# Patient Record
Sex: FEMALE
[De-identification: names, ages, dates, MRNs, and addresses within clinical notes are randomized per-mention and may not be internally consistent; named-entity substitution may affect disease eponyms.]

---

## 2017-04-12 ENCOUNTER — HOSPITAL ENCOUNTER (EMERGENCY)
Dept: HOSPITAL 31 - C.ER | Age: 72
Discharge: HOME | End: 2017-04-12
Payer: MEDICARE

## 2017-04-12 VITALS
OXYGEN SATURATION: 100 % | TEMPERATURE: 97.7 F | SYSTOLIC BLOOD PRESSURE: 113 MMHG | HEART RATE: 73 BPM | DIASTOLIC BLOOD PRESSURE: 73 MMHG

## 2017-04-12 VITALS — RESPIRATION RATE: 16 BRPM

## 2017-04-12 DIAGNOSIS — M79.662: ICD-10-CM

## 2017-04-12 DIAGNOSIS — R10.32: Primary | ICD-10-CM

## 2017-04-12 LAB
ALBUMIN/GLOB SERPL: 1.5 {RATIO} (ref 1–2.1)
ALP SERPL-CCNC: 81 U/L (ref 38–126)
ALT SERPL-CCNC: 17 U/L (ref 9–52)
AST SERPL-CCNC: 16 U/L (ref 14–36)
BACTERIA #/AREA URNS HPF: (no result) /[HPF]
BASOPHILS # BLD AUTO: 0.1 K/UL (ref 0–0.2)
BASOPHILS NFR BLD: 0.8 % (ref 0–2)
BILIRUB SERPL-MCNC: 0.4 MG/DL (ref 0.2–1.3)
BILIRUB UR-MCNC: NEGATIVE MG/DL
BUN SERPL-MCNC: 14 MG/DL (ref 7–17)
CALCIUM SERPL-MCNC: 9.5 MG/DL (ref 8.6–10.4)
CHLORIDE SERPL-SCNC: 88 MMOL/L (ref 98–107)
CO2 SERPL-SCNC: 36 MMOL/L (ref 22–30)
EOSINOPHIL # BLD AUTO: 0.1 K/UL (ref 0–0.7)
EOSINOPHIL NFR BLD: 0.8 % (ref 0–4)
ERYTHROCYTE [DISTWIDTH] IN BLOOD BY AUTOMATED COUNT: 14.1 % (ref 11.5–14.5)
GLOBULIN SER-MCNC: 3.2 GM/DL (ref 2.2–3.9)
GLUCOSE SERPL-MCNC: 139 MG/DL (ref 65–105)
GLUCOSE UR STRIP-MCNC: NORMAL MG/DL
HCT VFR BLD CALC: 45.8 % (ref 34–47)
HYALINE CASTS #/AREA URNS LPF: (no result) /LPF (ref 0–2)
KETONES UR STRIP-MCNC: NEGATIVE MG/DL
LEUKOCYTE ESTERASE UR-ACNC: (no result) LEU/UL
LYMPHOCYTES # BLD AUTO: 1.8 K/UL (ref 1–4.3)
LYMPHOCYTES NFR BLD AUTO: 17.6 % (ref 20–40)
MCH RBC QN AUTO: 27.6 PG (ref 27–31)
MCHC RBC AUTO-ENTMCNC: 32.5 G/DL (ref 33–37)
MCV RBC AUTO: 84.8 FL (ref 81–99)
MONOCYTES # BLD: 0.8 K/UL (ref 0–0.8)
MONOCYTES NFR BLD: 7.5 % (ref 0–10)
NRBC BLD AUTO-RTO: 0.2 % (ref 0–2)
PH UR STRIP: 6 [PH] (ref 5–8)
PLATELET # BLD: 329 K/UL (ref 130–400)
PMV BLD AUTO: 9.1 FL (ref 7.2–11.7)
POTASSIUM SERPL-SCNC: 4.6 MMOL/L (ref 3.6–5.2)
PROT SERPL-MCNC: 7.9 G/DL (ref 6.3–8.3)
PROT UR STRIP-MCNC: NEGATIVE MG/DL
RBC # UR STRIP: NEGATIVE /UL
RBC #/AREA URNS HPF: < 1 /HPF (ref 0–3)
SODIUM SERPL-SCNC: 140 MMOL/L (ref 132–148)
SP GR UR STRIP: 1.01 (ref 1–1.03)
UROBILINOGEN UR-MCNC: NORMAL MG/DL (ref 0.2–1)
WBC # BLD AUTO: 10.3 K/UL (ref 4.8–10.8)
WBC #/AREA URNS HPF: < 1 /HPF (ref 0–5)

## 2017-04-12 PROCEDURE — 74176 CT ABD & PELVIS W/O CONTRAST: CPT

## 2017-04-12 PROCEDURE — 99285 EMERGENCY DEPT VISIT HI MDM: CPT

## 2017-04-12 PROCEDURE — 85025 COMPLETE CBC W/AUTO DIFF WBC: CPT

## 2017-04-12 PROCEDURE — 96375 TX/PRO/DX INJ NEW DRUG ADDON: CPT

## 2017-04-12 PROCEDURE — 81001 URINALYSIS AUTO W/SCOPE: CPT

## 2017-04-12 PROCEDURE — 96374 THER/PROPH/DIAG INJ IV PUSH: CPT

## 2017-04-12 PROCEDURE — 80053 COMPREHEN METABOLIC PANEL: CPT

## 2017-04-12 PROCEDURE — 83690 ASSAY OF LIPASE: CPT

## 2017-04-12 PROCEDURE — 93005 ELECTROCARDIOGRAM TRACING: CPT

## 2017-04-12 NOTE — CARD
--------------- APPROVED REPORT --------------





EKG Measurement

Heart [id number]

CT 114P45

[id number]

[id number]

[id number]



<Conclusion>

*** Poor data quality, interpretation may be adversely affected

Normal sinus rhythm

Minimal voltage criteria for LVH, may be normal variant

Borderline ECG

## 2017-04-12 NOTE — CT
PROCEDURE:  CT Abdomen and Pelvis without intravenous contrast



HISTORY:

Pain



COMPARISON:

None.



TECHNIQUE:

Helical CT scan of the abdomen and pelvis was performed without 

administration of oral or intravenous contrast.  Coronal and sagittal 

reformatted images were obtained.



Radiation dose:



Total exam DLP = 813.04 mGy-cm.



This CT exam was performed using one or more of the following dose 

reduction techniques: Automated exposure control, adjustment of the 

mA and/or kV according to patient size, and/or use of iterative 

reconstruction technique.



FINDINGS:



LOWER THORAX:

The right lung base is clear.  There is a 7 mm subpleural nodule in 

the left lower lobe (series 2 image 10).  The left lung base is 

clear. 



LIVER:

The liver is normal in size. No gross lesion or ductal dilatation.  



GALLBLADDER AND BILE DUCTS:

Surgically absent. 



PANCREAS:

The pancreas is normal in size. No ductal dilatation or 

calcifications.



SPLEEN:

The spleen is normal in size. 



ADRENALS:

Both adrenal glands are normal in size without discrete nodule 



KIDNEYS AND URETERS:

Both kidneys are normal in size.  There is no hydronephrosis or 

nephrolithiasis. 



VASCULATURE:

Normal in appearance. No aortic aneurysm. 



BOWEL:

The small bowel loops are normal in caliber.  There is left colonic 

diverticulosis and apparent mild mural thickening of the descending 

and sigmoid colon. No evidence of fluid collection or 

microperforation. 



APPENDIX:

Normal appendix. 



PERITONEUM:

No free fluid. No free air. 



LYMPH NODES:

No enlarged lymph nodes. 



BLADDER:

Partially decompressed. 



REPRODUCTIVE:

Unremarkable. 



BONES:

No acute fracture. Mild multilevel degenerative disc disease. No 

destructive bony lesions. 



OTHER FINDINGS:

None.



IMPRESSION:

Left colonic diverticulosis.  Apparent mild mural thickening of the 

descending and sigmoid colon is nonspecific and could be related to 

underdistention however early acute diverticulitis cannot be 

excluded.  Clinical follow-up is advised. No evidence of 

microperforation or abscess.



No evidence of nephrolithiasis, obstructive uropathy or acute 

appendicitis.

## 2017-04-12 NOTE — C.PDOC
History Of Present Illness


Patient is a 71 year old female who presents to the ER with a complaint of 

cramping pain from her left foot radiating to her left lower leg. Patients 

states while she was walking to the bathroom  it radiated to her left abdomen 

and left back. Denies falling, urinary symptoms, vomiting, diarrhea


 or fever. 





Time Seen by Provider: 04/12/17 06:07


Chief Complaint (Nursing): Lower Extremity Problem/Injury


History Per: Patient


History/Exam Limitations: no limitations


Onset/Duration Of Symptoms: Hrs


Current Symptoms Are (Timing): Still Present





Past Medical History


Reviewed: Historical Data, Nursing Documentation, Vital Signs


Vital Signs: 


 Last Vital Signs











Temp  97.6 F   04/12/17 06:06


 


Pulse  75   04/12/17 06:06


 


Resp  16   04/12/17 06:06


 


BP  116/76   04/12/17 06:06


 


Pulse Ox  99   04/12/17 06:36














- Medical History


PMH: Anxiety, Arthritis, Asthma, HTN, Osteoporosis


Surgical History: Cholecystectomy


Family History: States: Unknown Family Hx





- Social History


Hx Alcohol Use: No


Hx Substance Use: No





- Immunization History


Hx Influenza Vaccination: No


Hx Pneumococcal Vaccination: No





Review Of Systems


Except As Marked, All Systems Reviewed And Found Negative.


Constitutional: Negative for: Fever, Chills


Gastrointestinal: Negative for: Vomiting


Genitourinary: Negative for: Dysuria, Hematuria


Musculoskeletal: Positive for: Back Pain, Leg Pain, Foot Pain, Other (Abdominal 

pain)





Physical Exam





- Physical Exam


Appears: Non-toxic


Skin: Normal Color, Warm, Dry


Head: Atraumatic, Normacephalic


Oral Mucosa: Moist


Chest: Symmetrical


Cardiovascular: Rhythm Regular


Respiratory: Normal Breath Sounds, No Rales, No Rhonchi, No Wheezing


Gastrointestinal/Abdominal: Soft, Tenderness (Left lower quadrant )


Back: CVA Tenderness (Left)


Extremity: Normal ROM, No Calf Tenderness


Pulses: Left Femoral: Normal, Right Femoral: Normal, Left Dorsalis Pedis: Normal

, Right Dorsalis Pedis: Normal


Neurological/Psych: Oriented x3, Normal Speech, Normal Cognition





ED Course And Treatment





- Laboratory Results


Result Diagrams: 


 04/12/17 06:54





O2 Sat by Pulse Oximetry: 99 (Room air)


Pulse Ox Interpretation: Normal


Progress Note: Blood work and urinalysis ordered. Toradol IVP administered.





Disposition





- Disposition


Disposition Time: 07:06


Condition: STABLE





- Clinical Impression


Clinical Impression: 


 Abdominal pain, Pain of lower extremity





- Scribe Statement


The provider has reviewed the documentation as recorded by the Scribe


Silver Schaeffer





All medical record entries made by the Scribe were at my direction and 

personally dictated by me. I have reviewed the chart and agree that the record 

accurately reflects my personal performance of the history, physical exam, 

medical decision making, and the department course for this patient. I have 

also personally directed, reviewed, and agree with the discharge instructions 

and disposition.

## 2017-10-24 ENCOUNTER — HOSPITAL ENCOUNTER (EMERGENCY)
Dept: HOSPITAL 31 - C.ER | Age: 72
Discharge: HOME | End: 2017-10-24
Payer: COMMERCIAL

## 2017-10-24 VITALS — HEART RATE: 71 BPM | SYSTOLIC BLOOD PRESSURE: 121 MMHG | DIASTOLIC BLOOD PRESSURE: 77 MMHG | RESPIRATION RATE: 16 BRPM

## 2017-10-24 VITALS — TEMPERATURE: 98.6 F

## 2017-10-24 VITALS — OXYGEN SATURATION: 99 %

## 2017-10-24 DIAGNOSIS — B37.2: Primary | ICD-10-CM

## 2017-10-24 DIAGNOSIS — L23.9: ICD-10-CM

## 2017-10-24 PROCEDURE — 96361 HYDRATE IV INFUSION ADD-ON: CPT

## 2017-10-24 PROCEDURE — 96374 THER/PROPH/DIAG INJ IV PUSH: CPT

## 2017-10-24 PROCEDURE — 99284 EMERGENCY DEPT VISIT MOD MDM: CPT

## 2017-10-24 PROCEDURE — 96375 TX/PRO/DX INJ NEW DRUG ADDON: CPT

## 2017-10-24 NOTE — C.PDOC
History Of Present Illness


Patient is a 71 year old female presents to Emergency Department for evaluation 

of allergic reaction that developed yesterday. Patient states that she 

developed itchy rash to the face, lower abdomen, and inguinal region. Notes 

being seen by PMD who gave her prescriptions of unknown medicine, which she 

states she was not able to fill. Notes taking Benadryl, and using calamine 

lotion without relief. Denies trouble swallowing, throat swelling, shortness of 

breath, chest pain, dizziness, or fever.





Time Seen by Provider: 10/24/17 12:11


Chief Complaint (Nursing): Allergic Reaction


History Per: Patient


History/Exam Limitations: no limitations


Onset/Duration Of Symptoms: Days (1)


Current Symptoms Are (Timing): Still Present


Associated Symptoms: Skin Rash, Itching.  denies: Swelling, Dyspnea, Trouble 

Swallowing, Dizziness, Redness, Chest Pain


Home/EMS Treatment: Benadryl


Recent travel outside of the [place]: No


Additional History Per: Patient





Past Medical History


Reviewed: Historical Data, Nursing Documentation, Vital Signs


Vital Signs: 


 Last Vital Signs











Temp  98.6 F   10/24/17 13:28


 


Pulse  71   10/24/17 13:28


 


Resp  16   10/24/17 13:28


 


BP  121/77   10/24/17 13:28


 


Pulse Ox  98   10/24/17 13:28














- Medical History


PMH: Anxiety, Arthritis, Asthma, HTN, Osteoporosis


Surgical History: Cholecystectomy


Family History: States: Unknown Family Hx





- Social History


Hx Alcohol Use: Yes


Hx Substance Use: No





- Immunization History


Hx Influenza Vaccination: No


Hx Pneumococcal Vaccination: No





Review Of Systems


Except As Marked, All Systems Reviewed And Found Negative.


Constitutional: Negative for: Fever, Chills


ENT: Negative for: Mouth Pain, Throat Pain, Throat Swelling


Cardiovascular: Negative for: Chest Pain


Respiratory: Negative for: Shortness of Breath


Gastrointestinal: Negative for: Nausea, Vomiting, Abdominal Pain, Diarrhea


Skin: Positive for: Rash


Neurological: Negative for: Headache, Dizziness





Physical Exam





- Physical Exam


Appears: Non-toxic, No Acute Distress


Skin: Warm, Dry, Rash (macular erythematous rash to inguinal folds and 

suprapubic region with shiny exterior and patchy margins; bright erythema 

surrounding nasolabial folds, no weeping, no vesicles)


Head: Atraumatic, Normacephalic


Eye(s): bilateral: Normal Inspection


Oral Mucosa: Moist


Tongue: Normal Appearing, No Swelling


Lips: Normal Appearing, No Swelling


Throat: Normal, No Drooling


Neck: Normal ROM, Supple


Chest: Symmetrical


Cardiovascular: Rhythm Regular, No Murmur


Respiratory: Normal Breath Sounds, No Rales, No Rhonchi, No Wheezing


Gastrointestinal/Abdominal: Soft, No Tenderness


Extremity: Normal ROM


Neurological/Psych: Oriented x3, Normal Speech





ED Course And Treatment


O2 Sat by Pulse Oximetry: 99 (RA)


Pulse Ox Interpretation: Normal





Medical Decision Making


Medical Decision Making: 


Plan:


* Miconazole


* Solu-Medrol


* Pepcid


* Benadryl


* IV fluids


* Reassess and dispo





Rash to abdomen appears candida, miconazole was applied. Rash to face appears 

allergy or unspecific dermatitis. Advise patient to take benadryl and will 

prescribe ointment





Disposition


Counseled Patient/Family Regarding: Diagnosis, Need For Followup, Rx Given





- Disposition


Referrals: 


Cedric Brooks MD [Staff Provider] - 


Disposition: HOME/ ROUTINE


Disposition Time: 12:58


Condition: STABLE


Additional Instructions: 


Marcos por dejarnos atenderlo hoy. Slaughter proveedor fue PA Sneed. Usted fue 

tratado por erupcin y picazn. [place] benadryl segn sea necesario para la picaz

n y aplique crema a las reas afectadas. Por favor katherin un seguimiento con slaughter m

dico primario para trinh evaluacin adicional.





Marcos por permitir que el equipo de [facility] [facility] [employer] sea parte de slaughter cuidado 

hoy.


Prescriptions: 


Miconazole 2% [Miconazole 2% Cream] 1 cre TOP BID #1 tube


Instructions:  Skin Yeast Infection (ED)


Forms:  [facility] (Portuguese)


Print Language: Burkinan





- POA


Present On Arrival: None





- Clinical Impression


Clinical Impression: 


 Candidal skin infection, Allergic dermatitis








- PA / NP / Resident Statement


MD/DO has reviewed & agrees with the documentation as recorded.





- Scribe Statement


The provider has reviewed the documentation as recorded by the Weiibjac Calixto





All medical record entries made by the Scribe were at my direction and 

personally dictated by me. I have reviewed the chart and agree that the record 

accurately reflects my personal performance of the history, physical exam, 

medical decision making, and the department course for this patient. I have 

also personally directed, reviewed, and agree with the discharge instructions 

and disposition.

## 2019-03-31 ENCOUNTER — HOSPITAL ENCOUNTER (EMERGENCY)
Dept: HOSPITAL 14 - H.ER | Age: 74
Discharge: HOME | End: 2019-03-31
Payer: COMMERCIAL

## 2019-03-31 VITALS — OXYGEN SATURATION: 97 % | RESPIRATION RATE: 18 BRPM | TEMPERATURE: 98.2 F

## 2019-03-31 VITALS — DIASTOLIC BLOOD PRESSURE: 87 MMHG | HEART RATE: 61 BPM | SYSTOLIC BLOOD PRESSURE: 169 MMHG

## 2019-03-31 DIAGNOSIS — J45.909: ICD-10-CM

## 2019-03-31 DIAGNOSIS — M81.0: ICD-10-CM

## 2019-03-31 DIAGNOSIS — R21: Primary | ICD-10-CM

## 2019-03-31 DIAGNOSIS — Z79.899: ICD-10-CM

## 2019-03-31 DIAGNOSIS — Z23: ICD-10-CM

## 2019-03-31 DIAGNOSIS — I10: ICD-10-CM

## 2019-03-31 LAB
ALBUMIN SERPL-MCNC: 4.3 G/DL (ref 3.5–5)
ALBUMIN/GLOB SERPL: 1.3 {RATIO} (ref 1–2.1)
ALT SERPL-CCNC: 25 U/L (ref 9–52)
AST SERPL-CCNC: 20 U/L (ref 14–36)
BASOPHILS # BLD AUTO: 0.1 K/UL (ref 0–0.2)
BASOPHILS NFR BLD: 0.6 % (ref 0–2)
BUN SERPL-MCNC: 16 MG/DL (ref 7–17)
CALCIUM SERPL-MCNC: 10.4 MG/DL (ref 8.4–10.2)
EOSINOPHIL # BLD AUTO: 0.1 K/UL (ref 0–0.7)
EOSINOPHIL NFR BLD: 1.7 % (ref 0–4)
ERYTHROCYTE [DISTWIDTH] IN BLOOD BY AUTOMATED COUNT: 14.4 % (ref 11.5–14.5)
GFR NON-AFRICAN AMERICAN: 55
HGB BLD-MCNC: 15 G/DL (ref 12–16)
LYMPHOCYTES # BLD AUTO: 1.7 K/UL (ref 1–4.3)
LYMPHOCYTES NFR BLD AUTO: 20.3 % (ref 20–40)
MCH RBC QN AUTO: 27.6 PG (ref 27–31)
MCHC RBC AUTO-ENTMCNC: 32 G/DL (ref 33–37)
MCV RBC AUTO: 86.1 FL (ref 81–99)
MONOCYTES # BLD: 0.8 K/UL (ref 0–0.8)
MONOCYTES NFR BLD: 9.5 % (ref 0–10)
NEUTROPHILS # BLD: 5.8 K/UL (ref 1.8–7)
NEUTROPHILS NFR BLD AUTO: 67.9 % (ref 50–75)
NRBC BLD AUTO-RTO: 0 % (ref 0–0)
PLATELET # BLD: 257 K/UL (ref 130–400)
PMV BLD AUTO: 8.8 FL (ref 7.2–11.7)
RBC # BLD AUTO: 5.43 MIL/UL (ref 3.8–5.2)
WBC # BLD AUTO: 8.6 K/UL (ref 4.8–10.8)

## 2019-03-31 PROCEDURE — 85025 COMPLETE CBC W/AUTO DIFF WBC: CPT

## 2019-03-31 PROCEDURE — 99283 EMERGENCY DEPT VISIT LOW MDM: CPT

## 2019-03-31 PROCEDURE — 96375 TX/PRO/DX INJ NEW DRUG ADDON: CPT

## 2019-03-31 PROCEDURE — 80053 COMPREHEN METABOLIC PANEL: CPT

## 2019-03-31 PROCEDURE — 96374 THER/PROPH/DIAG INJ IV PUSH: CPT

## 2019-03-31 PROCEDURE — 96361 HYDRATE IV INFUSION ADD-ON: CPT

## 2019-03-31 NOTE — ED PDOC
- Laboratory Results


Result Diagrams: 


                                 03/31/19 20:10





                                 03/31/19 20:10


Lab Results: 





                                        











Total Bilirubin  0.5 mg/dl (0.2-1.3)   03/31/19  20:10    


 


AST  20 U/L (14-36)   03/31/19  20:10    


 


ALT  25 U/L (9-52)   03/31/19  20:10    


 


Alkaline Phosphatase  85 U/L ()   03/31/19  20:10    


 


Total Protein  7.6 G/DL (6.3-8.2)   03/31/19  20:10    


 


Albumin  4.3 g/dL (3.5-5.0)   03/31/19  20:10    


 


Globulin  3.2 gm/dL (2.2-3.9)   03/31/19  20:10    


 


Albumin/Globulin Ratio  1.3  (1.0-2.1)   03/31/19  20:10    














- ECG


O2 Sat by Pulse Oximetry: 97





- Progress


ED Course And Treament: 





2000


Signed out to me pending labs and re-evaluation.





2015


On my initial evaluation pt. in no distress. Currently receiving meds. Will 

re-evaluate. 





2110


On re-evaluation, pt. reports good relief of itching. Rash still present but 

erythema has decreased. Denies SOB, throat swelling. Lungs clear b/l. No 

wheezing or stridor. Pt. requesting to be dc'd.


Advised to f/u with PMD for further evaluation but is to return to ED 

immediately if symptoms worsen. 





Disposition





- Clinical Impression


Clinical Impression: 


 Rash, Cellulitis








- POA


Present On Arrival: None





- Disposition


Referrals: 


[facility] [Outside]


Disposition: Routine/Home


Disposition Time: 21:17


Condition: IMPROVED


Additional Instructions: 


FOLLOW UP WITH YOUR DOCTOR FOR FURTHER EVALUATION


RETURN TO ED IMMEDIATELY IF SYMPTOMS WORSEN





HENRY HAMILTON, thank you for letting us take care of you today. Your provider 

was India Washington MD and you were treated for BODY RASH. The emergency 

medical care you received today was directed at your acute symptoms. If you were

 prescribed any medication, please fill it and take as directed. It may take 

several days for your symptoms to resolve. Return to the Emergency Department if

 your symptoms worsen, do not improve, or if you have any other problems.





Please contact your doctor or call one of the physicians/clinics you have been 

referred to that are listed on the Patient Visit Information form that is 

included in your discharge packet. Bring any paperwork you were given at 

discharge with you along with any medications you are taking to your follow up 

visit. Our treatment cannot replace ongoing medical care by a primary care 

provider outside of the emergency department.





Thank you for allowing the [email or web address] team to be part of your care today.








If you had an X-Ray or CT scan: A Radiologist will review the ED reading if any 

change in treatment is needed we will contact you.***





If you had a blood, urine, or wound culture: It will take several days for the 

results, if any change in treatment is needed we will contact you.***





If you had an STI test: It will take 48 hours for the results. Please call after

 1 week if you have not heard back.***


Prescriptions: 


Cephalexin [cephalexin] 500 mg PO Q6 #28 cap


DiphenhydrAMINE [Benadryl] 50 mg PO Q6 PRN #12 cap


 PRN Reason: ITCHING OR RASH


Methylprednisolone [Medrol Dose Pack (21 tabs)] 4 mg PO DAILY #21 mg


Instructions:  Skin Rash (DC), Cellulitis (Skin Infection), Adult (DC)


Forms:  [facility] (English)

## 2019-03-31 NOTE — ED PDOC
HPI: Skin/Bite Injury


Time Seen by Provider: 03/31/19 19:15


Chief Complaint (Nursing): Abnormal Skin Integrity


Chief Complaint (Provider): RASH 


History Per: Patient


History/Exam Limitations: no limitations


Onset/Duration Of Symptoms: Days


Current Symptoms Are (Timing): Still Present


Severity: Moderate


Pain Scale Rating Of: 0


Additional History Per: Patient


Additional Complaint(s): 


72 Y/O FEMALE WITH HX OF ASTHMA PRESENTS TO THE ED WITH EXTENSIVE REDNESS TO 

FACE EXTENDING TO NECK, CHEST AND UNDER BREAST. PT STATES SHE WAS STARTED ON 

PENICILLIN  2 WEEKS AGO AFTER BEING DX WITH THE FLU, PT REPORTS RASH STARTED TO 

THE FACE, PT WAS SEEN BY PMD LAST WEEK AND WAS INSTRUCTED TO DISCONTINUE TAKING 

ANTIBIOTICS AND GIVEN "WHITE CREAM" TO APPLY TO FACE. PT REPORTS RASH SPREAD 

WHICH PROMPTED ED VISIT. PT DENIES SOB, FEVER, JOINT PAIN, BURNING SENSATION. 








Past Medical History


Vital Signs: 





                                Last Vital Signs











Temp  98.2 F   03/31/19 19:09


 


Pulse  64   03/31/19 19:09


 


Resp  18   03/31/19 19:09


 


BP  131/82   03/31/19 19:09


 


Pulse Ox  97   03/31/19 19:09














- Medical History


PMH: Anxiety, Arthritis, Asthma, HTN, Osteoporosis





- Surgical History


Surgical History: Cholecystectomy





- Family History


Family History: States: Unknown Family Hx





- Living Arrangements


Living Arrangements: Alone





- Social History


Alcohol: None


Drugs: Denies





- Immunization History


Hx Influenza Vaccination: No


Hx Pneumococcal Vaccination: No





- Home Medications


Home Medications: 


                                Ambulatory Orders











 Medication  Instructions  Recorded


 


Baclofen [Lioresal] 10 mg PO BID 03/29/16


 


Gabapentin [Neurontin] 600 mg PO BID 03/29/16


 


Hydroxychloroquine Sulfate 200 mg PO BID 03/29/16





[Plaquenil]  


 


Losartan [Cozaar] 25 mg PO DAILY 03/29/16


 


Metoprolol Tartrate [Lopressor] 25 mg PO BID 03/29/16


 


Topiramate [Trokendi Xr] 25 mg PO DAILY 03/29/16


 


rOPINIRole [Requip] 0.25 mg PO BID 03/29/16


 


Amoxicillin/Clavulanate [Augmentin 1 tab PO BID #14 tab 04/12/17





875 MG-125 MG]  


 


Calcium 600-Vit D3 400 Tablet 1 tab 04/12/17


 


Furosemide 20 mg PO 04/12/17


 


Levocetirizine Dihydrochloride 5 mg PO PRN PRN 04/12/17


 


Meclizine HCl 12.5 mg PO PRN PRN 04/12/17


 


Montelukast 10 mg PO 04/12/17


 


Pantoprazole Sodium 40 mg PO PRN PRN 04/12/17


 


Proair Hfa 1 puff INH DAILY 04/12/17


 


Thera-M Caplet 1 tab PO DAILY 04/12/17


 


traMADol [Ultram] 50 mg PO TID PRN #10 tab 04/12/17


 


Miconazole 2% [Miconazole 2% Cream] 1 cre TOP BID #1 tube 10/24/17














- Allergies


Allergies/Adverse Reactions: 


                                    Allergies











Allergy/AdvReac Type Severity Reaction Status Date / Time


 


No Known Allergies Allergy   Verified 10/24/17 12:01














Review of Systems


ROS Statement: Except As Marked, All Systems Reviewed And Found Negative


Constitutional: Negative for: Fever, Sweats, Weakness, Malaise, Weight loss


Eyes: Negative for: Pain, Vision Change, Conjunctivae Inflammation, Eyelid 

Inflammation, Redness


ENT: Negative for: Ear Pain, Nose Congestion, Throat Swelling


Cardiovascular: Negative for: Chest Pain, Palpitations, Orthopnea


Respiratory: Negative for: Cough, Shortness of Breath, SOB with Exertion, 

Wheezing


Gastrointestinal: Negative for: Nausea, Vomiting, Abdominal Pain, Constipation


Genitourinary Female: Negative for: Dysuria


Musculoskeletal: Negative for: Neck Pain, Shoulder Pain, Arm Pain, Back Pain, 

Hand Pain, Leg Pain


Skin: Positive for: Rash (ITCHY, PT REPORTS WHEN SHE SHOWERS DRY SKIN PEELS OFF.

PT DENIES PREVIOUS BLISTERING OF THE SKIN)


Neurological: Negative for: Weakness





Physical Exam





- Reviewed


Nursing Documentation Reviewed: Yes


Vital Signs Reviewed: Yes





- Physical Exam


Appears: Positive for: Well, Non-toxic, No Acute Distress


Head Exam: Positive for: ATRAUMATIC, NORMAL INSPECTION, NORMOCEPHALIC


Skin: Positive for: Normal Color, Warm, Dry, Rash (MACULAR RASH TO FACE, NECK, 

CHEST AND UNDER BREAST, BEEFY RED, AREAS OF FLAKINESS TO THE FACE. Rash does not

extend entirely across cheeks, boarder of rash are clearly demarcated. NO 

OPENING OF THE SKIN)


Eye Exam: Positive for: EOMI, Normal appearance, PERRL


ENT: Positive for: Normal ENT Inspection


Neck: Positive for: Normal, Painless ROM


Cardiovascular/Chest: Positive for: Regular Rate, Rhythm


Respiratory: Positive for: CNT, Normal Breath Sounds


Gastrointestinal/Abdominal: Positive for: Normal Exam, Soft


Back: Positive for: Normal Inspection.  Negative for: L CVA Tenderness, R CVA 

Tenderness


Extremity: Positive for: Normal ROM


Neurological/Psych: Positive for: Awake, Alert, Normal Tone, Oriented





- ECG


O2 Sat by Pulse Oximetry: 97





- Progress


ED Course And Treament: 


CBC


CMP


BENADRYL 50MG 


PEPCID 20MG 


SOLUMEDROL 125MG 


0.9NS L 


RE-EVAL 











20:00: Pt handed off to mercedes kerns pa-c at bedside. Pending labs, monitor

for improvement 





Disposition





- Clinical Impression


Clinical Impression: 


 Rash








- Disposition


Disposition Time: 20:00


Condition: STABLE


Forms:  [facility] (English)


Patient Signed Over To: Jason Kerns


Handoff Comments: follow-up on labs, improvement of rash





- POA


Present On Arrival: None

## 2019-04-14 ENCOUNTER — HOSPITAL ENCOUNTER (EMERGENCY)
Dept: HOSPITAL 31 - C.ER | Age: 74
Discharge: HOME | End: 2019-04-14
Payer: COMMERCIAL

## 2019-04-14 VITALS
OXYGEN SATURATION: 97 % | DIASTOLIC BLOOD PRESSURE: 92 MMHG | SYSTOLIC BLOOD PRESSURE: 177 MMHG | HEART RATE: 92 BPM | TEMPERATURE: 99.4 F

## 2019-04-14 VITALS — RESPIRATION RATE: 18 BRPM

## 2019-04-14 DIAGNOSIS — L30.9: Primary | ICD-10-CM

## 2019-04-14 PROCEDURE — 99283 EMERGENCY DEPT VISIT LOW MDM: CPT

## 2019-04-14 PROCEDURE — 96372 THER/PROPH/DIAG INJ SC/IM: CPT

## 2019-04-14 NOTE — C.PDOC
History Of Present Illness





74 y/o female presents to ED complaining of persistent rash to chest and abdomen

for the past month. Patient has been seen in the ER and by her PMD a couple 

times for re-evaluations, but the rash persists. Patient seem to get better with

PO benadryl and hydrocortisone cream. She has not been seen by a dermatologist 

yet. She denies any new exposures. Denies fever or recent travel. 





Time Seen by Provider: 04/14/19 13:45


Chief Complaint (Nursing): Abnormal Skin Integrity


History Per: Patient


History/Exam Limitations: no limitations


Onset/Duration Of Symptoms: Days


Current Symptoms Are (Timing): Still Present





Past Medical History


Reviewed: Historical Data, Nursing Documentation, Vital Signs


Vital Signs: 





                                Last Vital Signs











Temp  99.4 F   04/14/19 13:30


 


Pulse  92 H  04/14/19 13:30


 


Resp  18   04/14/19 14:21


 


BP  177/92 H  04/14/19 13:30


 


Pulse Ox  97   04/14/19 13:30














- Medical History


PMH: Anxiety, Arthritis, Asthma, HTN, Osteoporosis


Surgical History: Cholecystectomy


Family History: States: No Known Family Hx





- Social History


Hx Alcohol Use: Yes


Hx Substance Use: No





- Immunization History


Hx Influenza Vaccination: No


Hx Pneumococcal Vaccination: No





Review Of Systems


Except As Marked, All Systems Reviewed And Found Negative.


Constitutional: Negative for: Fever


Skin: Positive for: Rash (chest and abdomen)





Physical Exam





- Physical Exam


Appears: Non-toxic, No Acute Distress


Skin: Other (erythematous macular rash to chest, breast, and abdomen)


Head: Atraumatic


Eye(s): bilateral: Normal Inspection


Oral Mucosa: Moist


Throat: Normal


Neck: Supple


Cardiovascular: Rhythm Regular, No Murmur


Respiratory: Normal Breath Sounds, No Rales, No Rhonchi, No Stridor, No Wheezing


Gastrointestinal/Abdominal: Soft, No Tenderness


Extremity: Bilateral: Atraumatic, Normal ROM


Neurological/Psych: Oriented x3, Normal Speech





ED Course And Treatment


O2 Sat by Pulse Oximetry: 97 (RA)


Pulse Ox Interpretation: Normal





Medical Decision Making


Medical Decision Making: 





Plan:


--Decadron 10 mg IM





Patient advised to follow up with dermatologist this week. 





Disposition





- Disposition


[facility] 


[facility] - 


Disposition: HOME/ ROUTINE


Disposition Time: 14:10


Condition: GOOD


Additional Instructions: 





ZAKI CLEMENTS, thank you for letting us take care of you today. The emergency 

medical care you received today was directed at your acute symptoms. If you were

prescribed any medication, please fill it and take as directed. It may take 

several days for your symptoms to resolve. Return to the Emergency Department if

your symptoms worsen, do not improve, or if you have any other problems.





Please contact your doctor or call one of the physicians/clinics you have been 

referred to that are listed on the Patient Visit Information form that is 

included in your discharge packet. Bring any paperwork you were given at 

discharge with you along with any medications you are taking to your follow up 

visit. Our treatment cannot replace ongoing medical care by a primary care 

provider outside of the emergency department.





Thank you for allowing the [facility] team to be part of your care today.











Start the prednisone prescription tomorrow morning.











Follow up with your primary care doctor in 2-3 days.  You can also follow up 

with one of the dermatologists on the list that was provided to you.


Prescriptions: 


DiphenhydrAMINE [Benadryl] 50 mg PO Q6 PRN #20 cap


 PRN Reason: Itching / Pruritus


predniSONE [Prednisone] 40 mg PO DAILY #10 tab


Instructions:  Dermatitis


Forms:  [facility] [employer] (English)





- Clinical Impression


Clinical Impression: 


 Dermatitis








- Scribe Statement


The provider has reviewed the documentation as recorded by the Patel Little





Provider Attestation: 








All medical record entries made by the Patel were at my direction and 

personally dictated by me. I have reviewed the chart and agree that the record 

accurately reflects my personal performance of the history, physical exam, 

medical decision making, and the department course for this patient. I have also

 personally directed, reviewed, and agree with the discharge instructions and 

disposition.